# Patient Record
Sex: FEMALE | Race: WHITE | NOT HISPANIC OR LATINO | Employment: FULL TIME | URBAN - METROPOLITAN AREA
[De-identification: names, ages, dates, MRNs, and addresses within clinical notes are randomized per-mention and may not be internally consistent; named-entity substitution may affect disease eponyms.]

---

## 2020-08-16 ENCOUNTER — HOSPITAL ENCOUNTER (EMERGENCY)
Facility: HOSPITAL | Age: 35
Discharge: HOME | End: 2020-08-17
Attending: EMERGENCY MEDICINE
Payer: MEDICAID

## 2020-08-16 DIAGNOSIS — S29.019A STRAIN OF THORACIC REGION, INITIAL ENCOUNTER: Primary | ICD-10-CM

## 2020-08-16 LAB
AMORPH CRY URNS QL MICRO: NORMAL
B-HCG UR QL: NEGATIVE
BILIRUB UR QL STRIP: NEGATIVE
CLARITY UR: ABNORMAL
COLOR UR: YELLOW
CTP QC/QA: YES
GLUCOSE UR QL STRIP: NEGATIVE
HGB UR QL STRIP: NEGATIVE
KETONES UR QL STRIP: NEGATIVE
LEUKOCYTE ESTERASE UR QL STRIP: ABNORMAL
MICROSCOPIC COMMENT: NORMAL
NITRITE UR QL STRIP: NEGATIVE
PH UR STRIP: 8 [PH] (ref 5–8)
PROT UR QL STRIP: NEGATIVE
SP GR UR STRIP: 1.02 (ref 1–1.03)
URN SPEC COLLECT METH UR: ABNORMAL
UROBILINOGEN UR STRIP-ACNC: NEGATIVE EU/DL
WBC #/AREA URNS HPF: 3 /HPF (ref 0–5)

## 2020-08-16 PROCEDURE — 81000 URINALYSIS NONAUTO W/SCOPE: CPT

## 2020-08-16 PROCEDURE — 99284 EMERGENCY DEPT VISIT MOD MDM: CPT | Mod: 25

## 2020-08-16 PROCEDURE — 81025 URINE PREGNANCY TEST: CPT | Performed by: NURSE PRACTITIONER

## 2020-08-16 PROCEDURE — 25000003 PHARM REV CODE 250: Performed by: NURSE PRACTITIONER

## 2020-08-16 RX ORDER — LIDOCAINE 50 MG/G
2 PATCH TOPICAL
Status: DISCONTINUED | OUTPATIENT
Start: 2020-08-16 | End: 2020-08-17 | Stop reason: HOSPADM

## 2020-08-16 RX ORDER — ORPHENADRINE CITRATE 100 MG/1
100 TABLET, EXTENDED RELEASE ORAL 2 TIMES DAILY
Qty: 20 TABLET | Refills: 0 | Status: SHIPPED | OUTPATIENT
Start: 2020-08-16 | End: 2020-08-26

## 2020-08-16 RX ORDER — KETOROLAC TROMETHAMINE 10 MG/1
10 TABLET, FILM COATED ORAL
Status: COMPLETED | OUTPATIENT
Start: 2020-08-16 | End: 2020-08-16

## 2020-08-16 RX ORDER — DIAZEPAM 5 MG/1
5 TABLET ORAL
Status: COMPLETED | OUTPATIENT
Start: 2020-08-16 | End: 2020-08-16

## 2020-08-16 RX ORDER — KETOROLAC TROMETHAMINE 10 MG/1
10 TABLET, FILM COATED ORAL EVERY 6 HOURS PRN
Qty: 12 TABLET | Refills: 0 | Status: SHIPPED | OUTPATIENT
Start: 2020-08-16

## 2020-08-16 RX ORDER — CYCLOBENZAPRINE HCL 10 MG
10 TABLET ORAL
Status: COMPLETED | OUTPATIENT
Start: 2020-08-16 | End: 2020-08-16

## 2020-08-16 RX ORDER — LIDOCAINE 50 MG/G
1 PATCH TOPICAL DAILY
Qty: 30 PATCH | Refills: 0 | Status: SHIPPED | OUTPATIENT
Start: 2020-08-16

## 2020-08-16 RX ADMIN — LIDOCAINE 2 PATCH: 50 PATCH TOPICAL at 10:08

## 2020-08-16 RX ADMIN — DIAZEPAM 5 MG: 5 TABLET ORAL at 11:08

## 2020-08-16 RX ADMIN — CYCLOBENZAPRINE 10 MG: 10 TABLET, FILM COATED ORAL at 10:08

## 2020-08-16 RX ADMIN — KETOROLAC TROMETHAMINE 10 MG: 10 TABLET, FILM COATED ORAL at 10:08

## 2020-08-17 VITALS
HEIGHT: 64 IN | RESPIRATION RATE: 18 BRPM | SYSTOLIC BLOOD PRESSURE: 124 MMHG | BODY MASS INDEX: 22.2 KG/M2 | TEMPERATURE: 99 F | WEIGHT: 130 LBS | DIASTOLIC BLOOD PRESSURE: 74 MMHG | HEART RATE: 90 BPM | OXYGEN SATURATION: 98 %

## 2020-08-17 NOTE — ED PROVIDER NOTES
Encounter Date: 8/16/2020       History     Chief Complaint   Patient presents with    Back Pain     Pt presents with worsening mid back pain that began today after a teenager hit her back going down a waterslide. Pt reports SOB from the pain     The patient is a 34-year-old female with no pertinent past medical history who presents to the ED complaining of severe back pain after going down an inflatable water side earlier this afternoon at which time to young children, who were going down the slide simultaneously, ran into the back of her.  Patient reports that she felt okay at 1st after the accident.  She reports that pain has been progressively worsening over the past several hours.  She denies numbness, weakness, tingling, and bowel/bladder changes.  No saddle anesthesia.  She is ambulatory.  No treatment attempted prior to arrival.    The history is provided by the patient.     Review of patient's allergies indicates:   Allergen Reactions    Betadine [povidone-iodine]     Iodine and iodide containing products     Latex, natural rubber     Penicillins      No past medical history on file.  Past Surgical History:   Procedure Laterality Date    BACK SURGERY       No family history on file.  Social History     Tobacco Use    Smoking status: Current Every Day Smoker    Smokeless tobacco: Never Used   Substance Use Topics    Alcohol use: Yes    Drug use: Not on file     Review of Systems   Constitutional: Negative for fever.   HENT: Negative for sore throat.    Respiratory: Negative for shortness of breath.    Cardiovascular: Negative for chest pain.   Gastrointestinal: Negative for nausea.   Genitourinary: Negative for dysuria.   Musculoskeletal: Positive for back pain. Negative for gait problem.   Skin: Negative for rash.   Neurological: Negative for dizziness and weakness.   Hematological: Does not bruise/bleed easily.       Physical Exam     Initial Vitals [08/16/20 2140]   BP Pulse Resp Temp SpO2    (!) 152/78 96 20 99.5 °F (37.5 °C) 97 %      MAP       --         Physical Exam    Nursing note and vitals reviewed.  Constitutional: She appears well-developed and well-nourished.  Non-toxic appearance. She appears distressed.   The patient is alert, oriented, and nontoxic appearing.  She is distressed and crying secondary to pain.   HENT:   Head: Normocephalic and atraumatic.   Right Ear: Hearing and abnromal external ear normal.   Left Ear: Hearing and abnormal external ear normal.   Nose: Nose abnormal.   Mouth/Throat: Mucous membranes are normal.   Eyes: Conjunctivae and EOM are normal.   Neck: Full passive range of motion without pain. Neck supple.   Cardiovascular: Normal rate, regular rhythm, normal heart sounds and normal pulses. Exam reveals no gallop and no friction rub.    No murmur heard.  Pulmonary/Chest: Effort normal and breath sounds normal. No respiratory distress. She has no wheezes. She has no rhonchi. She has no rales.   Musculoskeletal: Normal range of motion.      Thoracic back: She exhibits tenderness and bony tenderness. She exhibits no swelling and no deformity.        Back:       Comments: No step-off appreciated on spinal exam   Neurological: She is alert and oriented to person, place, and time. She has normal strength. Gait normal. GCS eye subscore is 4. GCS verbal subscore is 5. GCS motor subscore is 6.   No sensory deficits to lower extremities.  Muscular strength to lower extremities is normal and equal bilaterally.  Ambulatory with a steady gait.   Skin: Skin is warm, dry and intact. Capillary refill takes less than 2 seconds. No rash noted.   Psychiatric: She has a normal mood and affect. Her speech is normal and behavior is normal. Judgment and thought content normal. Cognition and memory are normal.         ED Course   Procedures  Labs Reviewed   URINALYSIS, REFLEX TO URINE CULTURE - Abnormal; Notable for the following components:       Result Value    Appearance, UA Hazy (*)      Leukocytes, UA Trace (*)     All other components within normal limits    Narrative:     Specimen Source->Urine   URINALYSIS MICROSCOPIC    Narrative:     Specimen Source->Urine   POCT URINE PREGNANCY          Imaging Results          X-Ray Thoracic Spine AP Lateral (Final result)  Result time 08/16/20 23:01:24    Final result by Clive Ford DO (08/16/20 23:01:24)                 Impression:      No acute fracture or subluxation of the cervical or thoracic spine.      Electronically signed by: Clive Ford  Date:    08/16/2020  Time:    23:01             Narrative:    EXAMINATION:  XR CERVICAL SPINE AP LATERAL; XR THORACIC SPINE AP LATERAL    CLINICAL HISTORY:  Back pain.    TECHNIQUE:  AP, lateral and open mouth views of the cervical spine were performed.    AP, lateral, and swimmer's radiographs of the thoracic spine.    COMPARISON:  Cervical spine radiographs from 07/22/2017.    FINDINGS:  Cervical spine: There is no evidence of acute fracture or subluxation of the cervical spine.  Alignment is normal.  There is a limbus vertebrae at the anterior inferior aspect of the C5 vertebral body.  The vertebral body heights and the joint spaces are preserved.  The prevertebral soft tissues are unremarkable.  The lateral masses are well aligned on open-mouth odontoid view.    Thoracic spine: There is no evidence of an acute fracture or dislocation of the thoracic spine.  Alignment is normal.  The vertebral body heights and the disc spaces are preserved.                               X-Ray Cervical Spine AP And Lateral (Final result)  Result time 08/16/20 23:01:24    Final result by Clive Ford DO (08/16/20 23:01:24)                 Impression:      No acute fracture or subluxation of the cervical or thoracic spine.      Electronically signed by: Clive Ford  Date:    08/16/2020  Time:    23:01             Narrative:    EXAMINATION:  XR CERVICAL SPINE AP LATERAL; XR THORACIC SPINE AP LATERAL    CLINICAL  HISTORY:  Back pain.    TECHNIQUE:  AP, lateral and open mouth views of the cervical spine were performed.    AP, lateral, and swimmer's radiographs of the thoracic spine.    COMPARISON:  Cervical spine radiographs from 07/22/2017.    FINDINGS:  Cervical spine: There is no evidence of acute fracture or subluxation of the cervical spine.  Alignment is normal.  There is a limbus vertebrae at the anterior inferior aspect of the C5 vertebral body.  The vertebral body heights and the joint spaces are preserved.  The prevertebral soft tissues are unremarkable.  The lateral masses are well aligned on open-mouth odontoid view.    Thoracic spine: There is no evidence of an acute fracture or dislocation of the thoracic spine.  Alignment is normal.  The vertebral body heights and the disc spaces are preserved.                                 Medical Decision Making:   History:   Old Medical Records: I decided to obtain old medical records.  Clinical Tests:   Lab Tests: Ordered and Reviewed  Radiological Study: Ordered and Reviewed  ED Management:  After complete evaluation, including thorough history and physical exam, the patient's symptoms are most likely due to mechanical/MSK back pain. There are no concerning features of bilateral weakness, bowel/bladder incontinence, significant new motor/sensory deficits, or saddle anesthesia to suggest acute cauda equina syndrome.  No evidence of significant acute abnormality on cervical or thoracic plain films of spine.  There is no fever, immunocompromise, history of recent surgery, or erythema/fluctuance to suggest epidural hematoma, infection, or abscess.     Patient reports no improvement following Flexeril, Toradol, and lidocaine patch administration at the time of reassessment.  She is still crying and reporting severe pain.  I will order a one-time dose of Valium and discharge with prescriptions for additional muscle relaxers, anti-inflammatories, and lidocaine patches.  Strict  return precautions have been discussed with the patient prior to her discharge.  PCP follow-up recommended in 2-3 days.  All questions answered.                                     Clinical Impression:       ICD-10-CM ICD-9-CM   1. Strain of thoracic region, initial encounter  S29.019A 847.1             ED Disposition Condition    Discharge Stable        ED Prescriptions     Medication Sig Dispense Start Date End Date Auth. Provider    lidocaine (LIDODERM) 5 % Place 1 patch onto the skin once daily. Remove & Discard patch within 12 hours or as directed by MD 30 patch 8/16/2020  Lety Solano NP    orphenadrine (NORFLEX) 100 mg tablet Take 1 tablet (100 mg total) by mouth 2 (two) times daily. for 10 days 20 tablet 8/16/2020 8/26/2020 Lety Solano NP    ketorolac (TORADOL) 10 mg tablet Take 1 tablet (10 mg total) by mouth every 6 (six) hours as needed for Pain. 12 tablet 8/16/2020  Lety Solano NP        Follow-up Information     Follow up With Specialties Details Why Contact Info Additional Information    [facility] Schedule an appointment as soon as possible for a visit in 3 days  [address], [address]  [place] [address] [phone] At this time [facility] will only use these entries [facility], [facility], and Emergency Department due to COVID-19 precautions.     [facility]  If symptoms worsen [address] [address] [phone]                                      Lety Solano NP  08/17/20 0880

## 2020-08-17 NOTE — ED NOTES
Patient presents to the ED with c/o worsening back pain after her daughter ran into her while going down a water slide earlier today. Patient reports SOB from the pain. Patient is ambulatory with a steady gate.     Patient identifiers verified and correct for Rossy Mohan.    LOC: The patient is awake, alert and aware of environment with an appropriate affect, the patient is oriented x 3 and speaking appropriately.  APPEARANCE: Patient resting comfortably and in no acute distress, patient is clean and well groomed, patient's clothing is properly fastened.  SKIN: The skin is warm and dry, color consistent with ethnicity, patient has normal skin turgor and moist mucus membranes, skin intact, no breakdown or bruising noted.  MUSCULOSKELETAL: +ROM mildly impaired +back pain  RESPIRATORY: Airway is open and patent, respirations are spontaneous, patient has a normal effort and rate, no accessory muscle use noted, bilateral breath sounds anterior/posterior clear/equal x4  CARDIAC: Patient has a normal rate.

## 2020-08-17 NOTE — DISCHARGE INSTRUCTIONS
Thank you for allowing me to care for you today.  I hope our treatment plan will make you feel better in the next few days.  In order for me to take better care of my future patients and improve our Emergency Department, I would appreciate if you can provide us with feedback.  In the next few days, you may receive a survey in the mail.  If you do, it would mean a great deal to me if you would please take the time to complete it.    Thank you and I hope you feel better.  Lety Solano NP

## 2021-03-25 ENCOUNTER — CLINICAL SUPPORT (OUTPATIENT)
Dept: REHABILITATION | Facility: HOSPITAL | Age: 36
End: 2021-03-25
Payer: MEDICAID

## 2021-03-25 DIAGNOSIS — M62.81 MUSCLE WEAKNESS: ICD-10-CM

## 2021-03-25 DIAGNOSIS — M79.601 RIGHT UPPER LIMB PAIN: ICD-10-CM

## 2021-03-25 DIAGNOSIS — M79.2 PAROXYSMAL NERVE PAIN: ICD-10-CM

## 2021-03-25 DIAGNOSIS — G56.21 LESION OF RIGHT ULNAR NERVE: ICD-10-CM

## 2021-03-25 DIAGNOSIS — G56.21 LESION OF RIGHT ULNAR NERVE: Primary | ICD-10-CM

## 2021-03-25 DIAGNOSIS — M79.601 PAIN OF RIGHT UPPER EXTREMITY: ICD-10-CM

## 2021-03-29 ENCOUNTER — CLINICAL SUPPORT (OUTPATIENT)
Dept: REHABILITATION | Facility: HOSPITAL | Age: 36
End: 2021-03-29
Payer: MEDICAID

## 2021-03-29 DIAGNOSIS — M62.81 MUSCLE WEAKNESS: ICD-10-CM

## 2021-03-29 DIAGNOSIS — M79.601 PAIN OF RIGHT UPPER EXTREMITY: ICD-10-CM

## 2021-03-29 PROCEDURE — 97167 OT EVAL HIGH COMPLEX 60 MIN: CPT | Mod: PN

## 2021-04-14 ENCOUNTER — CLINICAL SUPPORT (OUTPATIENT)
Dept: REHABILITATION | Facility: HOSPITAL | Age: 36
End: 2021-04-14
Payer: MEDICAID

## 2021-04-14 DIAGNOSIS — R29.898 DECREASED ROM OF NECK: ICD-10-CM

## 2021-04-14 DIAGNOSIS — M54.9 DORSALGIA: ICD-10-CM

## 2021-04-14 DIAGNOSIS — R29.898 WEAKNESS OF RIGHT UPPER EXTREMITY: ICD-10-CM

## 2021-04-14 DIAGNOSIS — M54.2 CERVICALGIA: ICD-10-CM

## 2021-04-14 DIAGNOSIS — G56.21 LESION OF RIGHT ULNAR NERVE: ICD-10-CM

## 2021-04-14 DIAGNOSIS — R29.3 POSTURE ABNORMALITY: ICD-10-CM

## 2021-04-14 DIAGNOSIS — M25.611 DECREASED ROM OF RIGHT SHOULDER: ICD-10-CM

## 2021-04-14 PROCEDURE — 97162 PT EVAL MOD COMPLEX 30 MIN: CPT | Mod: PN

## 2021-04-14 PROCEDURE — 97110 THERAPEUTIC EXERCISES: CPT | Mod: PN

## 2021-04-19 ENCOUNTER — CLINICAL SUPPORT (OUTPATIENT)
Dept: REHABILITATION | Facility: HOSPITAL | Age: 36
End: 2021-04-19
Payer: MEDICAID

## 2021-04-19 DIAGNOSIS — M79.601 PAIN OF RIGHT UPPER EXTREMITY: ICD-10-CM

## 2021-04-19 DIAGNOSIS — M62.81 MUSCLE WEAKNESS: ICD-10-CM

## 2021-04-19 PROCEDURE — 97530 THERAPEUTIC ACTIVITIES: CPT | Mod: PN

## 2021-05-10 ENCOUNTER — PATIENT MESSAGE (OUTPATIENT)
Dept: RESEARCH | Facility: HOSPITAL | Age: 36
End: 2021-05-10

## 2021-07-26 ENCOUNTER — DOCUMENTATION ONLY (OUTPATIENT)
Dept: REHABILITATION | Facility: HOSPITAL | Age: 36
End: 2021-07-26

## 2021-07-26 PROBLEM — R29.898 DECREASED ROM OF NECK: Status: RESOLVED | Noted: 2021-04-14 | Resolved: 2021-07-26

## 2021-07-26 PROBLEM — R29.3 POSTURE ABNORMALITY: Status: RESOLVED | Noted: 2021-04-14 | Resolved: 2021-07-26

## 2021-07-26 PROBLEM — R29.898 WEAKNESS OF RIGHT UPPER EXTREMITY: Status: RESOLVED | Noted: 2021-04-14 | Resolved: 2021-07-26

## 2021-07-26 PROBLEM — M25.611 DECREASED ROM OF RIGHT SHOULDER: Status: RESOLVED | Noted: 2021-04-14 | Resolved: 2021-07-26

## 2023-05-29 NOTE — PROGRESS NOTES
Initial OT Evaluation was cancelled this date and rescheduled. No treatment was provided this date.  Marcell Leonard, OT